# Patient Record
Sex: MALE | Race: WHITE | HISPANIC OR LATINO | Employment: FULL TIME | URBAN - METROPOLITAN AREA
[De-identification: names, ages, dates, MRNs, and addresses within clinical notes are randomized per-mention and may not be internally consistent; named-entity substitution may affect disease eponyms.]

---

## 2020-11-10 ENCOUNTER — OFFICE VISIT (OUTPATIENT)
Dept: URGENT CARE | Age: 25
End: 2020-11-10
Payer: COMMERCIAL

## 2020-11-10 VITALS
WEIGHT: 182 LBS | BODY MASS INDEX: 27.58 KG/M2 | OXYGEN SATURATION: 98 % | SYSTOLIC BLOOD PRESSURE: 156 MMHG | DIASTOLIC BLOOD PRESSURE: 80 MMHG | TEMPERATURE: 97.8 F | HEIGHT: 68 IN | HEART RATE: 73 BPM

## 2020-11-10 DIAGNOSIS — R43.0 LOSS OF SMELL: ICD-10-CM

## 2020-11-10 DIAGNOSIS — R48.1 LOSS OF PERCEPTION FOR TASTE: ICD-10-CM

## 2020-11-10 DIAGNOSIS — Z20.822 COVID-19 RULED OUT: Primary | ICD-10-CM

## 2020-11-10 PROCEDURE — 99203 OFFICE O/P NEW LOW 30 MIN: CPT | Performed by: NURSE PRACTITIONER

## 2020-11-10 PROCEDURE — U0003 INFECTIOUS AGENT DETECTION BY NUCLEIC ACID (DNA OR RNA); SEVERE ACUTE RESPIRATORY SYNDROME CORONAVIRUS 2 (SARS-COV-2) (CORONAVIRUS DISEASE [COVID-19]), AMPLIFIED PROBE TECHNIQUE, MAKING USE OF HIGH THROUGHPUT TECHNOLOGIES AS DESCRIBED BY CMS-2020-01-R: HCPCS | Performed by: NURSE PRACTITIONER

## 2020-11-10 RX ORDER — ACETAMINOPHEN 325 MG/1
650 TABLET ORAL EVERY 6 HOURS PRN
COMMUNITY

## 2020-11-11 ENCOUNTER — TELEPHONE (OUTPATIENT)
Dept: URGENT CARE | Age: 25
End: 2020-11-11

## 2020-11-11 LAB — SARS-COV-2 RNA SPEC QL NAA+PROBE: DETECTED

## 2020-11-12 ENCOUNTER — TELEPHONE (OUTPATIENT)
Dept: URGENT CARE | Age: 25
End: 2020-11-12

## 2023-07-17 ENCOUNTER — OFFICE VISIT (OUTPATIENT)
Dept: FAMILY MEDICINE CLINIC | Facility: CLINIC | Age: 28
End: 2023-07-17

## 2023-07-17 VITALS
WEIGHT: 182.4 LBS | RESPIRATION RATE: 16 BRPM | BODY MASS INDEX: 27.65 KG/M2 | DIASTOLIC BLOOD PRESSURE: 80 MMHG | HEART RATE: 78 BPM | HEIGHT: 68 IN | SYSTOLIC BLOOD PRESSURE: 146 MMHG | OXYGEN SATURATION: 100 %

## 2023-07-17 DIAGNOSIS — R68.82 DECREASED LIBIDO: ICD-10-CM

## 2023-07-17 DIAGNOSIS — R03.0 ELEVATED BLOOD-PRESSURE READING WITHOUT DIAGNOSIS OF HYPERTENSION: Primary | ICD-10-CM

## 2023-07-17 DIAGNOSIS — N52.9 DIFFICULTY ATTAINING ERECTION: ICD-10-CM

## 2023-07-17 PROCEDURE — 99213 OFFICE O/P EST LOW 20 MIN: CPT | Performed by: FAMILY MEDICINE

## 2023-07-17 RX ORDER — BLOOD PRESSURE TEST KIT
KIT MISCELLANEOUS DAILY
Qty: 1 KIT | Refills: 0 | Status: SHIPPED | OUTPATIENT
Start: 2023-07-17

## 2023-07-17 NOTE — PROGRESS NOTES
Name: Blas Cuevas      : 1995      MRN: [id number]  Encounter Provider: Dayton Cabello MD  Encounter Date: 2023   Encounter department: [address]     1. Elevated blood-pressure reading without diagnosis of hypertension  Assessment & Plan:  · Fm hx of htn in brother and father  · BP readings at home SBP in 140's, BP elevated in office today 146/80  · Asymptomatic  · Discussed life style modification including increasing physical activity to 150 mins mod activity per week, decreasing added salt to diet and choosing foods lower in sodium, d/c ing hooka. · BP cuff ordered today. Pt to keep BP log and bring to next appointment. If BP remains elevated at follow up appointment after lifestyle modifications may need to consider starting ace-I/arb. Orders:  -     Blood Pressure KIT; Use in the morning    2. Decreased libido  Assessment & Plan:  See difficulty attaining erection   Will order TSH to rule out thyroid origin of decreased libido and order testosterone lab. Will follow up. If lab work shows no abnormalities and symptoms persist may need to consider follow up with urology for further evaluation. Orders:  -     TSH, 3rd generation with Free T4 reflex; Future  -     Basic metabolic panel; Future  -     Testosterone, free, total; Future    3. Difficulty attaining erection  Assessment & Plan:  · 3 mo hx. Able to achieve erection but not as firm as used to be but is still able to penetrate. Denies trauma to penis or testicles. Denies issues with sexual partner or new sexual partner, pain with erection or ejaculation, or supplement use. Not on any medications at home. Endorses decreased libido. · Will order BMP to r/u hyperglycemia or electrolyte imbalance contributing to symptoms. Orders:  -     TSH, 3rd generation with Free T4 reflex; Future  -     Basic metabolic panel;  Future  -     Testosterone, free, total; Future         Subjective      Rajat Jon presents to clinic today to establish care. He is concerned about his blood pressure. Patient states he has been checking BP at his brother's house and SBP has been in the 140's. He denies symptoms of changes in vision, Headache, cp, palpitations, SOB, or decreased urination. Patient states brother was recently diagnosed with HTN and father  of a heart attack in  and also had HTN. Patient denies hx of smoking cigarettes currently or in the past but does endorse using hooka about once a week. He denies caffeine use or fast food or processed foods. Patient does state he adds salt while cooking. He states he exercises once a week by playing softball in [facility]. He states it is otherwise difficult to exercise as he works night shift and has a 1 yr old son at home. Patient states he drinks 2-3 cans of beer once a week. He denies snoring at night or episodes of stopping breathing at night or waking up with headache or waking up not well rested. Patient is also concerned because he has been having issues with erection over the past 3 months. He states he has had the same sexual partner and no new partners. Patient denies issues or difficulties with partner and states he feels safe with current partner. He states he is able to achieve erection but it is not as firm as it was in the past, however, it is firm enough for penetration and is able to maintain erection throughout intercourse (just not as firm as usual). He denies injury to penis or testicles, discharge from penis, discoloration of penis or testicles. He denies pain with erection or ejaculation. Denies urinary symptoms. Patient does endorse decreased sex drive, however, he states it is unclear whether this is due to issues with erection and not wanting to have intercourse due to fear of not being able to achieve erection. Patient states he is not on any medications and denies use of any supplements.        Review of Systems   Constitutional: Negative for chills and fever. HENT: Negative for rhinorrhea and sore throat. Eyes: Negative for redness and visual disturbance. Respiratory: Negative for cough and shortness of breath. Cardiovascular: Negative for chest pain and palpitations. Gastrointestinal: Negative for abdominal pain, constipation and nausea. Endocrine: Negative for cold intolerance and heat intolerance. Genitourinary: Negative for difficulty urinating, dysuria, hematuria, penile discharge, penile pain, scrotal swelling and testicular pain. Musculoskeletal: Negative for arthralgias and myalgias. Skin: Negative for rash. Allergic/Immunologic: Negative for environmental allergies and food allergies. Neurological: Negative for dizziness and headaches. Psychiatric/Behavioral: Negative for dysphoric mood. The patient is not nervous/anxious. Current Outpatient Medications on File Prior to Visit   Medication Sig   • acetaminophen (TYLENOL) 325 mg tablet Take 650 mg by mouth every 6 (six) hours as needed for mild pain       Objective     /80   Pulse 78   Resp 16   Ht 5' 8" (1.727 m)   Wt 82.7 kg (182 lb 6.4 oz)   SpO2 100%   BMI 27.73 kg/m²     Physical Exam  Constitutional:       General: He is not in acute distress. Appearance: Normal appearance. HENT:      Head: Normocephalic and atraumatic. Cardiovascular:      Rate and Rhythm: Normal rate and regular rhythm. Pulses: Normal pulses. Heart sounds: No murmur heard. Pulmonary:      Effort: Pulmonary effort is normal. No respiratory distress. Breath sounds: Normal breath sounds. Abdominal:      General: Bowel sounds are normal.      Palpations: Abdomen is soft. Tenderness: There is no abdominal tenderness. Musculoskeletal:         General: Normal range of motion. Cervical back: Normal range of motion. Right lower leg: No edema. Left lower leg: No edema.    Skin:     General: Skin is warm.   Neurological:      General: No focal deficit present. Mental Status: He is alert and oriented to person, place, and time.        Haim Andrade MD

## 2023-07-18 NOTE — ASSESSMENT & PLAN NOTE
See difficulty attaining erection   Will order TSH to rule out thyroid origin of decreased libido and order testosterone lab. Will follow up. If lab work shows no abnormalities and symptoms persist may need to consider follow up with urology for further evaluation.

## 2023-07-18 NOTE — ASSESSMENT & PLAN NOTE
· 3 mo hx. Able to achieve erection but not as firm as used to be but is still able to penetrate. Denies trauma to penis or testicles. Denies issues with sexual partner or new sexual partner, pain with erection or ejaculation, or supplement use. Not on any medications at home. Endorses decreased libido. · Will order BMP to r/u hyperglycemia or electrolyte imbalance contributing to symptoms.

## 2023-07-18 NOTE — ASSESSMENT & PLAN NOTE
· Fm hx of htn in brother and father  · BP readings at home SBP in 140's, BP elevated in office today 146/80  · Asymptomatic  · Discussed life style modification including increasing physical activity to 150 mins mod activity per week, decreasing added salt to diet and choosing foods lower in sodium, d/c ing hooka. · BP cuff ordered today. Pt to keep BP log and bring to next appointment. If BP remains elevated at follow up appointment after lifestyle modifications may need to consider starting ace-I/arb.

## 2023-07-19 ENCOUNTER — APPOINTMENT (OUTPATIENT)
Dept: LAB | Age: 28
End: 2023-07-19
Payer: COMMERCIAL

## 2023-07-19 DIAGNOSIS — N52.9 DIFFICULTY ATTAINING ERECTION: ICD-10-CM

## 2023-07-19 DIAGNOSIS — R68.82 DECREASED LIBIDO: ICD-10-CM

## 2023-07-19 LAB
ANION GAP SERPL CALCULATED.3IONS-SCNC: 3 MMOL/L
BUN SERPL-MCNC: 11 MG/DL (ref 5–25)
CALCIUM SERPL-MCNC: 9.5 MG/DL (ref 8.3–10.1)
CHLORIDE SERPL-SCNC: 108 MMOL/L (ref 96–108)
CO2 SERPL-SCNC: 28 MMOL/L (ref 21–32)
CREAT SERPL-MCNC: 1.08 MG/DL (ref 0.6–1.3)
GFR SERPL CREATININE-BSD FRML MDRD: 92 ML/MIN/1.73SQ M
GLUCOSE P FAST SERPL-MCNC: 97 MG/DL (ref 65–99)
POTASSIUM SERPL-SCNC: 3.7 MMOL/L (ref 3.5–5.3)
SODIUM SERPL-SCNC: 139 MMOL/L (ref 135–147)
TSH SERPL DL<=0.05 MIU/L-ACNC: 1.95 UIU/ML (ref 0.45–4.5)

## 2023-07-19 PROCEDURE — 36415 COLL VENOUS BLD VENIPUNCTURE: CPT

## 2023-07-19 PROCEDURE — 84443 ASSAY THYROID STIM HORMONE: CPT

## 2023-07-19 PROCEDURE — 80048 BASIC METABOLIC PNL TOTAL CA: CPT

## 2023-07-20 ENCOUNTER — APPOINTMENT (OUTPATIENT)
Dept: LAB | Age: 28
End: 2023-07-20
Payer: COMMERCIAL

## 2023-07-20 DIAGNOSIS — R68.82 DECREASED LIBIDO: ICD-10-CM

## 2023-07-20 DIAGNOSIS — N52.9 DIFFICULTY ATTAINING ERECTION: ICD-10-CM

## 2023-07-20 PROCEDURE — 84402 ASSAY OF FREE TESTOSTERONE: CPT

## 2023-07-20 PROCEDURE — 84403 ASSAY OF TOTAL TESTOSTERONE: CPT

## 2023-07-20 PROCEDURE — 36415 COLL VENOUS BLD VENIPUNCTURE: CPT

## 2023-07-21 LAB
TESTOST FREE SERPL-MCNC: 14.8 PG/ML (ref 9.3–26.5)
TESTOST SERPL-MCNC: 467 NG/DL (ref 264–916)

## 2023-08-15 ENCOUNTER — RA CDI HCC (OUTPATIENT)
Dept: OTHER | Facility: HOSPITAL | Age: 28
End: 2023-08-15

## 2023-08-15 NOTE — PROGRESS NOTES
[address] [facility] coding opportunities       Chart reviewed, no opportunity found: CHART REVIEWED, NO OPPORTUNITY FOUND        Patients Insurance        Commercial Insurance: [employer]

## 2024-07-15 DIAGNOSIS — A59.9 TRICHOMONIASIS: Primary | ICD-10-CM

## 2024-07-15 RX ORDER — METRONIDAZOLE 500 MG/1
500 TABLET ORAL 2 TIMES DAILY
Qty: 14 TABLET | Refills: 0 | Status: SHIPPED | OUTPATIENT
Start: 2024-07-15 | End: 2024-07-22